# Patient Record
Sex: FEMALE | Race: BLACK OR AFRICAN AMERICAN | Employment: FULL TIME | URBAN - METROPOLITAN AREA
[De-identification: names, ages, dates, MRNs, and addresses within clinical notes are randomized per-mention and may not be internally consistent; named-entity substitution may affect disease eponyms.]

---

## 2019-01-16 PROBLEM — O99.810 ABNORMAL GLUCOSE AFFECTING PREGNANCY: Status: ACTIVE | Noted: 2019-01-16

## 2019-01-16 PROBLEM — O21.9 PREGNANCY RELATED NAUSEA AND VOMITING, ANTEPARTUM: Status: ACTIVE | Noted: 2019-01-16

## 2019-01-16 PROCEDURE — 88142 CYTOPATH C/V THIN LAYER: CPT

## 2019-01-17 ENCOUNTER — HOSPITAL ENCOUNTER (OUTPATIENT)
Dept: LAB | Age: 24
Discharge: HOME | End: 2019-01-17
Attending: OBSTETRICS & GYNECOLOGY
Payer: COMMERCIAL

## 2019-01-18 ENCOUNTER — HOSPITAL ENCOUNTER (OUTPATIENT)
Dept: LAB | Age: 24
Discharge: HOME | End: 2019-01-18
Payer: COMMERCIAL

## 2019-01-18 LAB — GLUCOSE P FAST SERPL-MCNC: 86 MG/DL

## 2019-01-18 PROCEDURE — 36415 COLL VENOUS BLD VENIPUNCTURE: CPT

## 2019-02-11 PROBLEM — Z34.80 PRENATAL CARE OF MULTIGRAVIDA, ANTEPARTUM: Status: ACTIVE | Noted: 2019-02-11

## 2019-03-29 ENCOUNTER — HOSPITAL ENCOUNTER (OUTPATIENT)
Dept: LAB | Age: 24
Discharge: HOME | End: 2019-03-29
Payer: COMMERCIAL

## 2019-03-29 LAB
GLUCOSE P FAST SERPL-MCNC: 75 MG/DL
GLUCOSE, 1 HR,GTT1: 130 MG/DL
GLUCOSE, 2 HR,GTT2: 156 MG/DL
GLUCOSE, 3 HR,GTT3: 109 MG/DL

## 2019-03-29 PROCEDURE — 82951 GLUCOSE TOLERANCE TEST (GTT): CPT

## 2019-03-29 PROCEDURE — 82952 GTT-ADDED SAMPLES: CPT

## 2019-03-29 PROCEDURE — 36415 COLL VENOUS BLD VENIPUNCTURE: CPT

## 2019-04-18 PROBLEM — O44.00 PLACENTA PREVIA ANTEPARTUM: Status: ACTIVE | Noted: 2019-04-18

## 2019-05-30 PROBLEM — O44.00 PLACENTA PREVIA ANTEPARTUM: Status: RESOLVED | Noted: 2019-04-18 | Resolved: 2019-05-30

## 2019-06-10 ENCOUNTER — TELEPHONE (OUTPATIENT)
Dept: DIABETES SERVICES | Age: 24
End: 2019-06-10

## 2019-06-10 NOTE — TELEPHONE ENCOUNTER
No show for gestational diabetes education today. Called pt and left message regarding the other 2 available classes scheduled for this week. Asked pt to call back and let me know which class she can come to.

## 2019-06-14 ENCOUNTER — HOSPITAL ENCOUNTER (OUTPATIENT)
Dept: DIABETES SERVICES | Age: 24
Discharge: HOME | End: 2019-06-14
Payer: COMMERCIAL

## 2019-06-14 VITALS — HEIGHT: 67 IN | BODY MASS INDEX: 31.71 KG/M2 | WEIGHT: 202 LBS

## 2019-06-14 PROCEDURE — G0109 DIAB MANAGE TRN IND/GROUP: HCPCS

## 2019-06-14 NOTE — PROGRESS NOTES
Gestational Diabetes Self-Management Support Plan    Services Provided: Pt received education on nutrition and meal planning during pregnancy. Emotional support for adjustment to diagnosis was provided. Care Plan/Recommendations:  Pt instructed to record blood sugar 4x/day and record all meals and snacks. Pt to bring information to appointments with [facility]. Notes for Follow Up:   1. Barriers to checking blood glucose and adherence to meal plan identified: pt to  her glucometer today  2. Barriers to psychological adjustment to diagnosis identified: none-history of gestational diabetes with first pregnancy. 3. Patient needs to be seen by [facility] ASAP due to: pt reports blood sugars not controlled with last pregnancy and was on insulin.     Clement Perkins, [address], LD, CDE  [facility] [address]

## 2019-06-21 PROBLEM — O24.419 GESTATIONAL DIABETES: Status: ACTIVE | Noted: 2019-06-21

## 2019-06-21 PROBLEM — Z98.891 HISTORY OF C-SECTION: Status: ACTIVE | Noted: 2019-06-21

## 2019-06-21 PROBLEM — O44.03 PLACENTA PREVIA ANTEPARTUM IN THIRD TRIMESTER: Status: ACTIVE | Noted: 2019-04-18

## 2019-07-22 PROBLEM — O34.219 HISTORY OF CESAREAN DELIVERY AFFECTING PREGNANCY: Status: ACTIVE | Noted: 2019-06-21

## 2019-07-22 PROBLEM — O21.9 PREGNANCY RELATED NAUSEA AND VOMITING, ANTEPARTUM: Status: RESOLVED | Noted: 2019-01-16 | Resolved: 2019-07-22

## 2019-07-22 PROBLEM — O09.93 HIGH-RISK PREGNANCY IN THIRD TRIMESTER: Status: ACTIVE | Noted: 2019-02-11

## 2019-07-25 PROBLEM — O24.414 INSULIN CONTROLLED GESTATIONAL DIABETES MELLITUS (GDM) IN THIRD TRIMESTER: Status: ACTIVE | Noted: 2019-06-21

## 2019-07-25 PROBLEM — Z86.32 HISTORY OF GESTATIONAL DIABETES IN PRIOR PREGNANCY, CURRENTLY PREGNANT, THIRD TRIMESTER: Status: ACTIVE | Noted: 2019-07-25

## 2019-07-25 PROBLEM — O09.293 HISTORY OF GESTATIONAL DIABETES IN PRIOR PREGNANCY, CURRENTLY PREGNANT, THIRD TRIMESTER: Status: ACTIVE | Noted: 2019-07-25

## 2019-08-01 PROBLEM — O40.9XX0 POLYHYDRAMNIOS AFFECTING PREGNANCY: Status: ACTIVE | Noted: 2019-08-01

## 2019-08-01 PROBLEM — O09.299 HISTORY OF MACROSOMIA IN INFANT IN PRIOR PREGNANCY, CURRENTLY PREGNANT: Status: ACTIVE | Noted: 2019-08-01

## 2019-08-05 PROBLEM — O44.03 PLACENTA PREVIA ANTEPARTUM IN THIRD TRIMESTER: Status: RESOLVED | Noted: 2019-04-18 | Resolved: 2019-08-05

## 2019-08-12 ENCOUNTER — ANESTHESIA (OUTPATIENT)
Dept: LABOR AND DELIVERY | Age: 24
DRG: 540 | End: 2019-08-12
Payer: COMMERCIAL

## 2019-08-12 ENCOUNTER — HOSPITAL ENCOUNTER (INPATIENT)
Age: 24
LOS: 3 days | Discharge: HOME | DRG: 540 | End: 2019-08-15
Attending: OBSTETRICS & GYNECOLOGY | Admitting: OBSTETRICS & GYNECOLOGY
Payer: COMMERCIAL

## 2019-08-12 ENCOUNTER — ANESTHESIA EVENT (OUTPATIENT)
Dept: LABOR AND DELIVERY | Age: 24
DRG: 540 | End: 2019-08-12
Payer: COMMERCIAL

## 2019-08-12 DIAGNOSIS — O34.219 HISTORY OF CESAREAN DELIVERY AFFECTING PREGNANCY: Primary | ICD-10-CM

## 2019-08-12 PROBLEM — Z37.9 NORMAL LABOR: Status: ACTIVE | Noted: 2019-08-12

## 2019-08-12 LAB
A1 MICROGLOB PLACENTAL VAG QL: POSITIVE
ABO + RH BLD: NORMAL
ANION GAP SERPL CALC-SCNC: 7 MMOL/L (ref 7–16)
BASOPHILS # BLD: 0 K/UL (ref 0–0.2)
BASOPHILS NFR BLD: 0 % (ref 0–2)
BLOOD GROUP ANTIBODIES SERPL: NORMAL
BUN SERPL-MCNC: 16 MG/DL (ref 6–23)
CALCIUM SERPL-MCNC: 8.8 MG/DL (ref 8.3–10.4)
CHLORIDE SERPL-SCNC: 109 MMOL/L (ref 98–107)
CO2 SERPL-SCNC: 22 MMOL/L (ref 21–32)
CONTROL LINE PRESENT?: NORMAL
CREAT SERPL-MCNC: 0.65 MG/DL (ref 0.6–1)
DIFFERENTIAL METHOD BLD: ABNORMAL
EOSINOPHIL # BLD: 0 K/UL (ref 0–0.8)
EOSINOPHIL NFR BLD: 0 % (ref 0.5–7.8)
ERYTHROCYTE [DISTWIDTH] IN BLOOD BY AUTOMATED COUNT: 13.2 % (ref 11.9–14.6)
EXPIRATION DATE: NORMAL
GLUCOSE BLD STRIP.AUTO-MCNC: 90 MG/DL (ref 65–100)
GLUCOSE SERPL-MCNC: 80 MG/DL (ref 65–100)
HCT VFR BLD AUTO: 33.2 % (ref 35.8–46.3)
HGB BLD-MCNC: 10.4 G/DL (ref 11.7–15.4)
IMM GRANULOCYTES # BLD AUTO: 0.2 K/UL (ref 0–0.5)
IMM GRANULOCYTES NFR BLD AUTO: 2 % (ref 0–5)
INTERNAL NEGATIVE CONTROL: NORMAL
KIT LOT NO.: NORMAL
LYMPHOCYTES # BLD: 1.3 K/UL (ref 0.5–4.6)
LYMPHOCYTES NFR BLD: 16 % (ref 13–44)
MCH RBC QN AUTO: 30.1 PG (ref 26.1–32.9)
MCHC RBC AUTO-ENTMCNC: 31.3 G/DL (ref 31.4–35)
MCV RBC AUTO: 96 FL (ref 79.6–97.8)
MONOCYTES # BLD: 1 K/UL (ref 0.1–1.3)
MONOCYTES NFR BLD: 13 % (ref 4–12)
NEUTS SEG # BLD: 5.3 K/UL (ref 1.7–8.2)
NEUTS SEG NFR BLD: 68 % (ref 43–78)
NRBC # BLD: 0 K/UL (ref 0–0.2)
PLATELET # BLD AUTO: 139 K/UL (ref 150–450)
PMV BLD AUTO: 12.9 FL (ref 9.4–12.3)
POTASSIUM SERPL-SCNC: 4 MMOL/L (ref 3.5–5.1)
RBC # BLD AUTO: 3.46 M/UL (ref 4.05–5.2)
SODIUM SERPL-SCNC: 138 MMOL/L (ref 136–145)
SPECIMEN EXP DATE BLD: NORMAL
WBC # BLD AUTO: 7.7 K/UL (ref 4.3–11.1)

## 2019-08-12 PROCEDURE — 4A1HXCZ MONITORING OF PRODUCTS OF CONCEPTION, CARDIAC RATE, EXTERNAL APPROACH: ICD-10-PCS | Performed by: OBSTETRICS & GYNECOLOGY

## 2019-08-12 PROCEDURE — 77030018846 HC SOL IRR STRL H20 ICUM -A: Performed by: OBSTETRICS & GYNECOLOGY

## 2019-08-12 PROCEDURE — 77030008459 HC STPLR SKN COOP -B: Performed by: OBSTETRICS & GYNECOLOGY

## 2019-08-12 PROCEDURE — 99283 EMERGENCY DEPT VISIT LOW MDM: CPT | Performed by: OBSTETRICS & GYNECOLOGY

## 2019-08-12 PROCEDURE — 77030031139 HC SUT VCRL2 J&J -A: Performed by: OBSTETRICS & GYNECOLOGY

## 2019-08-12 PROCEDURE — 76010000391 HC C SECN FIRST 1 HR: Performed by: OBSTETRICS & GYNECOLOGY

## 2019-08-12 PROCEDURE — 74011250636 HC RX REV CODE- 250/636: Performed by: ANESTHESIOLOGY

## 2019-08-12 PROCEDURE — 77030002966 HC SUT PDS J&J -A: Performed by: OBSTETRICS & GYNECOLOGY

## 2019-08-12 PROCEDURE — 77030034696 HC CATH URETH FOL 2W BARD -A: Performed by: OBSTETRICS & GYNECOLOGY

## 2019-08-12 PROCEDURE — 65270000029 HC RM PRIVATE

## 2019-08-12 PROCEDURE — 74011250636 HC RX REV CODE- 250/636: Performed by: OBSTETRICS & GYNECOLOGY

## 2019-08-12 PROCEDURE — 74011250636 HC RX REV CODE- 250/636

## 2019-08-12 PROCEDURE — 85025 COMPLETE CBC W/AUTO DIFF WBC: CPT

## 2019-08-12 PROCEDURE — 77030002933 HC SUT MCRYL J&J -A: Performed by: OBSTETRICS & GYNECOLOGY

## 2019-08-12 PROCEDURE — 74011000250 HC RX REV CODE- 250: Performed by: ANESTHESIOLOGY

## 2019-08-12 PROCEDURE — 36415 COLL VENOUS BLD VENIPUNCTURE: CPT

## 2019-08-12 PROCEDURE — 77030018836 HC SOL IRR NACL ICUM -A: Performed by: OBSTETRICS & GYNECOLOGY

## 2019-08-12 PROCEDURE — 74011000250 HC RX REV CODE- 250

## 2019-08-12 PROCEDURE — 84112 EVAL AMNIOTIC FLUID PROTEIN: CPT | Performed by: OBSTETRICS & GYNECOLOGY

## 2019-08-12 PROCEDURE — 76060000078 HC EPIDURAL ANESTHESIA: Performed by: OBSTETRICS & GYNECOLOGY

## 2019-08-12 PROCEDURE — 77030007880 HC KT SPN EPDRL BBMI -B: Performed by: NURSE ANESTHETIST, CERTIFIED REGISTERED

## 2019-08-12 PROCEDURE — 74011250637 HC RX REV CODE- 250/637: Performed by: ANESTHESIOLOGY

## 2019-08-12 PROCEDURE — 77030003665 HC NDL SPN BBMI -A: Performed by: NURSE ANESTHETIST, CERTIFIED REGISTERED

## 2019-08-12 PROCEDURE — 80048 BASIC METABOLIC PNL TOTAL CA: CPT

## 2019-08-12 PROCEDURE — 86900 BLOOD TYPING SEROLOGIC ABO: CPT

## 2019-08-12 PROCEDURE — 77030005518 HC CATH URETH FOL 2W BARD -B: Performed by: OBSTETRICS & GYNECOLOGY

## 2019-08-12 PROCEDURE — 75410000003 HC RECOV DEL/VAG/CSECN EA 0.5 HR: Performed by: OBSTETRICS & GYNECOLOGY

## 2019-08-12 PROCEDURE — 76010000392 HC C SECN EA ADDL 0.5 HR: Performed by: OBSTETRICS & GYNECOLOGY

## 2019-08-12 PROCEDURE — 82962 GLUCOSE BLOOD TEST: CPT

## 2019-08-12 PROCEDURE — 77030032490 HC SLV COMPR SCD KNE COVD -B: Performed by: OBSTETRICS & GYNECOLOGY

## 2019-08-12 RX ORDER — EPHEDRINE SULFATE 50 MG/ML
INJECTION, SOLUTION INTRAVENOUS AS NEEDED
Status: DISCONTINUED | OUTPATIENT
Start: 2019-08-12 | End: 2019-08-12 | Stop reason: HOSPADM

## 2019-08-12 RX ORDER — TRISODIUM CITRATE DIHYDRATE AND CITRIC ACID MONOHYDRATE 500; 334 MG/5ML; MG/5ML
30 SOLUTION ORAL
Status: COMPLETED | OUTPATIENT
Start: 2019-08-12 | End: 2019-08-12

## 2019-08-12 RX ORDER — NALOXONE HYDROCHLORIDE 0.4 MG/ML
0.2 INJECTION, SOLUTION INTRAMUSCULAR; INTRAVENOUS; SUBCUTANEOUS
Status: ACTIVE | OUTPATIENT
Start: 2019-08-12 | End: 2019-08-13

## 2019-08-12 RX ORDER — LORAZEPAM 2 MG/ML
0.5 INJECTION INTRAMUSCULAR ONCE
Status: COMPLETED | OUTPATIENT
Start: 2019-08-12 | End: 2019-08-12

## 2019-08-12 RX ORDER — SODIUM CHLORIDE, SODIUM LACTATE, POTASSIUM CHLORIDE, CALCIUM CHLORIDE 600; 310; 30; 20 MG/100ML; MG/100ML; MG/100ML; MG/100ML
INJECTION, SOLUTION INTRAVENOUS
Status: DISCONTINUED | OUTPATIENT
Start: 2019-08-12 | End: 2019-08-12 | Stop reason: HOSPADM

## 2019-08-12 RX ORDER — MORPHINE SULFATE 1 MG/ML
INJECTION, SOLUTION EPIDURAL; INTRATHECAL; INTRAVENOUS
Status: DISCONTINUED | OUTPATIENT
Start: 2019-08-12 | End: 2019-08-12 | Stop reason: HOSPADM

## 2019-08-12 RX ORDER — BUPIVACAINE HYDROCHLORIDE 7.5 MG/ML
INJECTION, SOLUTION INTRASPINAL
Status: DISCONTINUED | OUTPATIENT
Start: 2019-08-12 | End: 2019-08-12 | Stop reason: HOSPADM

## 2019-08-12 RX ORDER — SODIUM CHLORIDE, SODIUM LACTATE, POTASSIUM CHLORIDE, CALCIUM CHLORIDE 600; 310; 30; 20 MG/100ML; MG/100ML; MG/100ML; MG/100ML
25 INJECTION, SOLUTION INTRAVENOUS CONTINUOUS
Status: DISCONTINUED | OUTPATIENT
Start: 2019-08-12 | End: 2019-08-13

## 2019-08-12 RX ORDER — SODIUM CHLORIDE 0.9 % (FLUSH) 0.9 %
5-40 SYRINGE (ML) INJECTION EVERY 8 HOURS
Status: DISCONTINUED | OUTPATIENT
Start: 2019-08-12 | End: 2019-08-15 | Stop reason: HOSPADM

## 2019-08-12 RX ORDER — OXYTOCIN/RINGER'S LACTATE 30/500 ML
PLASTIC BAG, INJECTION (ML) INTRAVENOUS
Status: DISCONTINUED | OUTPATIENT
Start: 2019-08-12 | End: 2019-08-12 | Stop reason: HOSPADM

## 2019-08-12 RX ORDER — OXYCODONE HYDROCHLORIDE 5 MG/1
10 TABLET ORAL
Status: ACTIVE | OUTPATIENT
Start: 2019-08-12 | End: 2019-08-13

## 2019-08-12 RX ORDER — KETOROLAC TROMETHAMINE 30 MG/ML
INJECTION, SOLUTION INTRAMUSCULAR; INTRAVENOUS AS NEEDED
Status: DISCONTINUED | OUTPATIENT
Start: 2019-08-12 | End: 2019-08-12 | Stop reason: HOSPADM

## 2019-08-12 RX ORDER — LORAZEPAM 2 MG/ML
0.5 INJECTION INTRAMUSCULAR ONCE
Status: DISPENSED | OUTPATIENT
Start: 2019-08-12 | End: 2019-08-13

## 2019-08-12 RX ORDER — DIPHENHYDRAMINE HYDROCHLORIDE 50 MG/ML
12.5 INJECTION, SOLUTION INTRAMUSCULAR; INTRAVENOUS
Status: ACTIVE | OUTPATIENT
Start: 2019-08-12 | End: 2019-08-13

## 2019-08-12 RX ORDER — MORPHINE SULFATE 0.5 MG/ML
INJECTION, SOLUTION EPIDURAL; INTRATHECAL; INTRAVENOUS AS NEEDED
Status: DISCONTINUED | OUTPATIENT
Start: 2019-08-12 | End: 2019-08-12 | Stop reason: HOSPADM

## 2019-08-12 RX ORDER — KETOROLAC TROMETHAMINE 30 MG/ML
30 INJECTION, SOLUTION INTRAMUSCULAR; INTRAVENOUS
Status: DISPENSED | OUTPATIENT
Start: 2019-08-12 | End: 2019-08-13

## 2019-08-12 RX ORDER — ONDANSETRON 2 MG/ML
4 INJECTION INTRAMUSCULAR; INTRAVENOUS
Status: DISPENSED | OUTPATIENT
Start: 2019-08-12 | End: 2019-08-13

## 2019-08-12 RX ORDER — ONDANSETRON 2 MG/ML
INJECTION INTRAMUSCULAR; INTRAVENOUS AS NEEDED
Status: DISCONTINUED | OUTPATIENT
Start: 2019-08-12 | End: 2019-08-12 | Stop reason: HOSPADM

## 2019-08-12 RX ORDER — DEXTROSE, SODIUM CHLORIDE, SODIUM LACTATE, POTASSIUM CHLORIDE, AND CALCIUM CHLORIDE 5; .6; .31; .03; .02 G/100ML; G/100ML; G/100ML; G/100ML; G/100ML
125 INJECTION, SOLUTION INTRAVENOUS CONTINUOUS
Status: DISCONTINUED | OUTPATIENT
Start: 2019-08-12 | End: 2019-08-12 | Stop reason: ALTCHOICE

## 2019-08-12 RX ORDER — HYDROMORPHONE HYDROCHLORIDE 2 MG/ML
1 INJECTION, SOLUTION INTRAMUSCULAR; INTRAVENOUS; SUBCUTANEOUS
Status: DISPENSED | OUTPATIENT
Start: 2019-08-12 | End: 2019-08-13

## 2019-08-12 RX ORDER — LORAZEPAM 0.5 MG/1
1 TABLET ORAL
Status: DISCONTINUED | OUTPATIENT
Start: 2019-08-12 | End: 2019-08-12 | Stop reason: ALTCHOICE

## 2019-08-12 RX ORDER — CEFAZOLIN SODIUM/WATER 2 G/20 ML
2 SYRINGE (ML) INTRAVENOUS ONCE
Status: COMPLETED | OUTPATIENT
Start: 2019-08-12 | End: 2019-08-12

## 2019-08-12 RX ORDER — SODIUM CHLORIDE, SODIUM LACTATE, POTASSIUM CHLORIDE, CALCIUM CHLORIDE 600; 310; 30; 20 MG/100ML; MG/100ML; MG/100ML; MG/100ML
2000 INJECTION, SOLUTION INTRAVENOUS CONTINUOUS
Status: DISCONTINUED | OUTPATIENT
Start: 2019-08-12 | End: 2019-08-12 | Stop reason: ALTCHOICE

## 2019-08-12 RX ORDER — SODIUM CHLORIDE 0.9 % (FLUSH) 0.9 %
5-40 SYRINGE (ML) INJECTION AS NEEDED
Status: DISCONTINUED | OUTPATIENT
Start: 2019-08-12 | End: 2019-08-15 | Stop reason: HOSPADM

## 2019-08-12 RX ADMIN — EPHEDRINE SULFATE 10 MG: 50 INJECTION, SOLUTION INTRAVENOUS at 14:35

## 2019-08-12 RX ADMIN — Medication 2 G: at 13:49

## 2019-08-12 RX ADMIN — ONDANSETRON 4 MG: 2 INJECTION INTRAMUSCULAR; INTRAVENOUS at 14:46

## 2019-08-12 RX ADMIN — SODIUM CHLORIDE, SODIUM LACTATE, POTASSIUM CHLORIDE, AND CALCIUM CHLORIDE 2000 ML: 600; 310; 30; 20 INJECTION, SOLUTION INTRAVENOUS at 12:55

## 2019-08-12 RX ADMIN — FAMOTIDINE 20 MG: 10 INJECTION, SOLUTION INTRAVENOUS at 13:50

## 2019-08-12 RX ADMIN — SODIUM CITRATE AND CITRIC ACID MONOHYDRATE 30 ML: 500; 334 SOLUTION ORAL at 13:50

## 2019-08-12 RX ADMIN — Medication 500 ML/HR: at 14:46

## 2019-08-12 RX ADMIN — BUPIVACAINE HYDROCHLORIDE 12 MG: 7.5 INJECTION, SOLUTION INTRASPINAL at 14:15

## 2019-08-12 RX ADMIN — KETOROLAC TROMETHAMINE 30 MG: 30 INJECTION, SOLUTION INTRAMUSCULAR; INTRAVENOUS at 14:58

## 2019-08-12 RX ADMIN — EPHEDRINE SULFATE 15 MG: 50 INJECTION, SOLUTION INTRAVENOUS at 14:57

## 2019-08-12 RX ADMIN — MORPHINE SULFATE 0.15 MG: 1 INJECTION, SOLUTION EPIDURAL; INTRATHECAL; INTRAVENOUS at 14:15

## 2019-08-12 RX ADMIN — LORAZEPAM 0.5 MG: 2 INJECTION, SOLUTION INTRAMUSCULAR; INTRAVENOUS at 19:39

## 2019-08-12 RX ADMIN — SODIUM CHLORIDE, SODIUM LACTATE, POTASSIUM CHLORIDE, CALCIUM CHLORIDE: 600; 310; 30; 20 INJECTION, SOLUTION INTRAVENOUS at 14:11

## 2019-08-12 RX ADMIN — AZITHROMYCIN MONOHYDRATE 500 MG: 500 INJECTION, POWDER, LYOPHILIZED, FOR SOLUTION INTRAVENOUS at 13:49

## 2019-08-12 NOTE — ANESTHESIA PROCEDURE NOTES
Spinal Block    Start time: 8/12/2019 2:11 PM  End time: 8/12/2019 2:15 PM  Performed by: Francisco Gaston MD  Authorized by: Francisco Gaston MD     Pre-procedure:   Indications: primary anesthetic  Preanesthetic Checklist: patient identified, risks and benefits discussed, anesthesia consent, site marked, patient being monitored and timeout performed    Timeout Time: 14:11          Spinal Block:   Patient Position:  Seated  Prep Region:  Lumbar  Prep: chlorhexidine      Location:  L3-4  Technique:  Single shot    Local Dose (mL):  1    Needle:   Needle Type:  Pencan  Needle Gauge:  25 G  Attempts:  1      Events: CSF confirmed, no blood with aspiration and no paresthesia        Assessment:  Insertion:  Uncomplicated  Patient tolerance:  Patient tolerated the procedure well with no immediate complications

## 2019-08-12 NOTE — PROGRESS NOTES
Clarified order with . Questioned if he wanted Ativan p.o. Or I.V. Stating that we did get another I.V. On patient. Ativan 0.5 mg IV  ordered with temperature recheck in 15 minutes if no improvement give another 0.5 mg IV and recheck temperature in an additional 15 minutes. Call if no improvement.

## 2019-08-12 NOTE — ROUTINE PROCESS
SBAR IN Report: Mother Verbal report received from MAYKEL Teixeira RN (full name & credentials) on this patient, who is now being transferred from [facility] (unit) for routine post - op. The patient is wearing a green \"Anesthesia-Duramorph\" band. Report consisted of patient's Situation, Background, Assessment and Recommendations (SBAR).  ID bands were compared with the identification form, and verified with the patient and transferring nurse. Information from the Procedure Summary and the [place] Report was reviewed with the transferring nurse; opportunity for questions and clarification provided.

## 2019-08-12 NOTE — ANESTHESIA POSTPROCEDURE EVALUATION
Procedure(s):   SECTION. No value filed. Anesthesia Post Evaluation      Multimodal analgesia: multimodal analgesia used between 6 hours prior to anesthesia start to PACU discharge  Patient location during evaluation: PACU  Patient participation: complete - patient participated  Level of consciousness: awake and alert  Pain management: adequate  Airway patency: patent  Anesthetic complications: no  Cardiovascular status: acceptable  Respiratory status: acceptable  Hydration status: acceptable  Post anesthesia nausea and vomiting:  none      No vitals data found for the desired time range.

## 2019-08-12 NOTE — H&P
History & Physical    Name: Tal Schmidt MRN: [id number]  SSN: [id number]    YOB: 1995  Age: 20 y.o. Sex: female        Subjective: Pt presents with SROM 2-3 hours ago and painful uterine ctx every 4-5 minutes lasting 30 seconds. There are no ameliorating or exacerbating factors. Prenatal course c/b Insulin requiring GDM, polyhydramnios, and prior C section. Estimated Date of Delivery: 19  OB History    Para Term  AB Living   2 1 1 0 0 1   SAB TAB Ectopic Molar Multiple Live Births   0 0 0 0 0 1      # Outcome Date GA Lbr Chilo/2nd Weight Sex Delivery Anes PTL Lv   2 Current            1 Term 2017 37w0d  4.366 kg M CS-LTranv   DARLENE       Ms. Herbert Hermosillo is seen with pregnancy at 38w0d for Presumptive ROM . Please see prenatal records for details. Past Medical History:   Diagnosis Date    Anemia     Chlamydia     5 yrs ago?  Gestational diabetes      Past Surgical History:   Procedure Laterality Date    HX  SECTION       Social History     Occupational History    Not on file   Tobacco Use    Smoking status: Former Smoker    Smokeless tobacco: Never Used   Substance and Sexual Activity    Alcohol use: No     Frequency: Never    Drug use: No    Sexual activity: Yes     Partners: Male     Birth control/protection: None     Family History   Problem Relation Age of Onset    Cancer Maternal Aunt         Lung and other    Alzheimer Maternal Grandmother        No Known Allergies  Prior to Admission medications    Medication Sig Start Date End Date Taking? Authorizing Provider   insulin detemir U-100 (LEVEMIR FLEXTOUCH U-100 INSULN) 100 unit/mL (3 mL) inpn 12-25 units as directed twice daily; May substitute with Lantus or Basaglar 19  Yes Osmar Garrido MD   Insulin Needles, Disposable, (UNIFINE PENTIPS) 32 gauge x \" ndle 1 Pen Needle by Does Not Apply route five (5) times daily.  19  Yes Osmar Garrido MD   [facility] ULTRA BLUE TEST STRIP strip TEST 4 TIMES A DAY 6/3/19  Yes Provider, Historical   Tim Robi monitoring kit TEST 4 TIMES A DAY 6/3/19  Yes Provider, Historical   ONE TOUCH DELICA 33 gauge misc TEST 4 TIMES A DAY 6/3/19  Yes Provider, Historical   prenatal multivit-ca-min-fe-fa tab Take 1 Tab by mouth daily. 19  Yes Jan Earl MD        Review of Systems:  Constitutional:No headache, fever  Cardiac:   No chest pain      Resp: No cough or shortness of breath     GI:   No nausea/vomiting, diarrhea, abdominal pain    :   No dysuria  Neuro:     No vision changes, headache      Objective:     Vitals:  Vitals:    19 1217 19 1218   BP: 125/71    Pulse: 86    Resp: 18    Temp: 99 °F (37.2 °C)    Weight:  100.2 kg (221 lb)        Physical Exam:  Patient without distress. Heart: Regular rate and rhythm  Lung: clear to auscultation throughout lung fields, no wheezes, no rales, no rhonchi and normal respiratory effort  Back: costovertebral angle tenderness absent  Abdomen: soft, nontender  Fundus: soft and non tender  Cervical Exam: 2 cm dilated    50% effaced    -2 station    Presenting Part: cephalic  Lower Extremities:  - Edema No  Membranes:  Spontaneous Rupture of Membranes; Amniotic Fluid: clear fluid  Fetal Heart Rate: Baseline: 150 per minute  Variability: moderate  Accelerations: no  Decelerations: none  Uterine contractions: regular, every 4-6 minutes    Prenatal Labs:   Lab Results   Component Value Date/Time    Rubella, External Immune  (52.0) 2019    HBsAg, External Negative 2019    HIV, External Negative 2019    RPR, External Negative 2019    Gonorrhea, External Negative 2019    Chlamydia, External Negative 2019         Assessment/Plan:     Ms. Klaus Blanton is a  seen with pregnancy at 38w0d for Presumptive ROM  and labor. No results found for: GRBSEXT    Plan:     Admit for repeat C section. Message left for Dr. Joaquina Turner.

## 2019-08-12 NOTE — OP NOTES
Ankur Funez  1995    Preop Dx:   1. IUP @ 38 weeks gestation   2. Prev C/S    Postop Dx: Same    Procedure: RLTCS    Surgeon: Karolina Turner     Anesthesia: spinal    EBL: 156 mL      Complications: None    Findings:  Viable Female infant. vtx position. APGARS 9,9.  Weight:   [id number]  Normal appearing uterus, tubes and ovaries. Procedure:  Pt was taken to the operating room where spinal anesthesia was found to be adequate. She was then prepped and draped in the usual sterile fashion and placed in the supine position with a leftward tilt. A Pfannenstiel skin incision was made with the scalpel and carried down to the underlying layer of fascia with the bovie. The fascia was incised in the midline and the incision extended laterally with the crabtree scissors. Superior of the fascial incision was grasped with Kocher clamps and  from the rectus muscles sharply and bluntly. In a similar fashion, the inferior aspect of the fascial incision was grasped with the Kocher clamps and  from the rectus muscles sharply and bluntly. The rectus muscles were  in the midline bluntly and peritoneum entered bluntly. The peritoneal incision was extended manually. Bladder flap was created by sharp dissectionand plapcement of a bladder blade. The lower uterine incision made with the scalpel. Incision extended manually laterally. Infants head delivered atraumatically. Nose and mouth suctioned. Cord clamped and cut. Infant handed off to the waiting NICU staff. The uterus was exteriorized and cleared of all clots and debris. Hysterotomy was closed with 0-vicryl in a running, locking fashion. The pelvis and gutters were cleared of all clots and debris, the uterus returned to the abdomen and hemostasis was assured throughout. Fascial incision repaired with 0-vicryl in a running fashion. Subcutaneous tissue was cleared of all clots and debris and hemostasis assured.   Subcutaneous tissue reapproximated with 3-0 vicryl rapide in a running fashion. Skin closed with insorb staples. Pt tolerated procedure well. Sponge, lap and needle counts correct x 3. Disposition: Pt to RR stable and infant to  nursery stable.     Pathology: none      Hildreth Cristian, DO  3:25 PM  19

## 2019-08-12 NOTE — PROGRESS NOTES
SBAR OUT Report: Mother    Verbal report given to Rosendo Guerrero RN on this patient, who is now being transferred to MIU for routine progression of care. The patient is wearing a green \"Anesthesia-Duramorph\" band. Report consisted of patient's Situation, Background, Assessment and Recommendations (SBAR).  ID bands were compared with the identification form, and verified with the patient and receiving nurse. Information from the Procedure Summary, Intake/Output and MAR and the [place] Report was reviewed with the receiving nurse; opportunity for questions and clarification provided.

## 2019-08-12 NOTE — PROGRESS NOTES
Nursing supervisor called for warming blanket. Dr. Elizabeth Quiñones on unit. Order received for 1 mg of ativan stat and recheck temp after 30 minutes.

## 2019-08-12 NOTE — PROGRESS NOTES
Upon hourly assessment, patient states she feels hot but her body tempature to the touch feels warm. Unable to obtain oral temp but axillary temp was 94.9. Warm blankets from OR placed on patient. IV had infiltrated upon assessment and unable to give Zofran for nausea. Dr. Chopra Heads contacted. Order received for bear hugger and MD will come see patient.

## 2019-08-12 NOTE — PROGRESS NOTES
FHTs in OR prior to spinal 150s with moderate variability; FHTs after spinal 145s with moderate variability.

## 2019-08-12 NOTE — ANESTHESIA PREPROCEDURE EVALUATION
Relevant Problems   No relevant active problems       Anesthetic History   No history of anesthetic complications            Review of Systems / Medical History  Patient summary reviewed, nursing notes reviewed and pertinent labs reviewed    Pulmonary  Within defined limits                 Neuro/Psych   Within defined limits           Cardiovascular  Within defined limits                Exercise tolerance: >4 METS     GI/Hepatic/Renal                Endo/Other    Diabetes: using insulin    Anemia     Other Findings   Comments: Gestational DM           Physical Exam    Airway  Mallampati: II  TM Distance: 4 - 6 cm  Neck ROM: normal range of motion   Mouth opening: Normal     Cardiovascular    Rhythm: regular           Dental  No notable dental hx       Pulmonary  Breath sounds clear to auscultation               Abdominal  GI exam deferred       Other Findings            Anesthetic Plan    ASA: 3  Anesthesia type: spinal            Anesthetic plan and risks discussed with: Patient

## 2019-08-12 NOTE — PROGRESS NOTES
Warming blanket also ordered. Nursing supervisor attempting to locate warming blanket. Warm blankets from O.R. Placed on patient until warmer can be located.

## 2019-08-13 LAB
HCT VFR BLD AUTO: 28.3 % (ref 35.8–46.3)
HGB BLD-MCNC: 8.9 G/DL (ref 11.7–15.4)

## 2019-08-13 PROCEDURE — 74011250636 HC RX REV CODE- 250/636: Performed by: ANESTHESIOLOGY

## 2019-08-13 PROCEDURE — 74011250637 HC RX REV CODE- 250/637: Performed by: OBSTETRICS & GYNECOLOGY

## 2019-08-13 PROCEDURE — 65270000029 HC RM PRIVATE

## 2019-08-13 PROCEDURE — 85018 HEMOGLOBIN: CPT

## 2019-08-13 PROCEDURE — 36415 COLL VENOUS BLD VENIPUNCTURE: CPT

## 2019-08-13 RX ORDER — IBUPROFEN 800 MG/1
800 TABLET ORAL 3 TIMES DAILY
Status: DISCONTINUED | OUTPATIENT
Start: 2019-08-13 | End: 2019-08-15 | Stop reason: HOSPADM

## 2019-08-13 RX ORDER — ADHESIVE BANDAGE
30 BANDAGE TOPICAL DAILY PRN
Status: DISCONTINUED | OUTPATIENT
Start: 2019-08-13 | End: 2019-08-15 | Stop reason: HOSPADM

## 2019-08-13 RX ORDER — PRENATAL VIT 96/IRON FUM/FOLIC 27MG-0.8MG
1 TABLET ORAL DAILY
Status: DISCONTINUED | OUTPATIENT
Start: 2019-08-14 | End: 2019-08-15 | Stop reason: HOSPADM

## 2019-08-13 RX ORDER — SIMETHICONE 80 MG
80 TABLET,CHEWABLE ORAL
Status: DISCONTINUED | OUTPATIENT
Start: 2019-08-13 | End: 2019-08-15 | Stop reason: HOSPADM

## 2019-08-13 RX ORDER — SODIUM CHLORIDE 0.9 % (FLUSH) 0.9 %
5-40 SYRINGE (ML) INJECTION AS NEEDED
Status: DISCONTINUED | OUTPATIENT
Start: 2019-08-13 | End: 2019-08-15 | Stop reason: HOSPADM

## 2019-08-13 RX ORDER — OXYCODONE HYDROCHLORIDE 5 MG/1
5 TABLET ORAL
Status: DISCONTINUED | OUTPATIENT
Start: 2019-08-13 | End: 2019-08-15 | Stop reason: HOSPADM

## 2019-08-13 RX ORDER — NALOXONE HYDROCHLORIDE 0.4 MG/ML
0.4 INJECTION, SOLUTION INTRAMUSCULAR; INTRAVENOUS; SUBCUTANEOUS AS NEEDED
Status: DISCONTINUED | OUTPATIENT
Start: 2019-08-13 | End: 2019-08-15 | Stop reason: HOSPADM

## 2019-08-13 RX ORDER — ZOLPIDEM TARTRATE 5 MG/1
5 TABLET ORAL
Status: DISCONTINUED | OUTPATIENT
Start: 2019-08-13 | End: 2019-08-15 | Stop reason: HOSPADM

## 2019-08-13 RX ORDER — ACETAMINOPHEN 10 MG/ML
1000 INJECTION, SOLUTION INTRAVENOUS ONCE
Status: ACTIVE | OUTPATIENT
Start: 2019-08-13 | End: 2019-08-14

## 2019-08-13 RX ORDER — SODIUM CHLORIDE 0.9 % (FLUSH) 0.9 %
5-40 SYRINGE (ML) INJECTION EVERY 8 HOURS
Status: DISCONTINUED | OUTPATIENT
Start: 2019-08-13 | End: 2019-08-15 | Stop reason: HOSPADM

## 2019-08-13 RX ADMIN — OXYCODONE HYDROCHLORIDE 5 MG: 5 TABLET ORAL at 19:15

## 2019-08-13 RX ADMIN — SIMETHICONE 80 MG: 80 TABLET, CHEWABLE ORAL at 18:00

## 2019-08-13 RX ADMIN — IBUPROFEN 800 MG: 800 TABLET, FILM COATED ORAL at 17:59

## 2019-08-13 RX ADMIN — Medication 10 ML: at 09:11

## 2019-08-13 RX ADMIN — KETOROLAC TROMETHAMINE 30 MG: 30 INJECTION, SOLUTION INTRAMUSCULAR at 00:06

## 2019-08-13 RX ADMIN — KETOROLAC TROMETHAMINE 30 MG: 30 INJECTION, SOLUTION INTRAMUSCULAR at 09:10

## 2019-08-13 NOTE — LACTATION NOTE
Called to NCU to assist mom with pumping. Brought mom's breast pump kit to infant's room and reviewed with her how to use the pump as well as the breast pump kit. Started mom pumping in the initiation phase and she expressed zero. Reinforced to her the need to pump at least 8 times in 24 hours. Also reviewed with her how to label her syringes to bring back to the nursery. Lactation consultant with follow up tomorrow.

## 2019-08-13 NOTE — PROGRESS NOTES
Medina discontinued. OOB to restroom with standby assistance. Pericare taught. Advised to drink lots of water so that she can produce 2 voids greater than 200 prior to six hours after medina d/c. Patient tolerated activity well. Patient verbalized understanding.

## 2019-08-13 NOTE — PROGRESS NOTES
Pt is S/PLTC-section at term due to fetal intolerance of labor/CPD. No complaints today. Normal PO pain. No GI/ issues. Lochia < menses. No F/C. Visit Vitals  /69 (BP 1 Location: Right arm, BP Patient Position: At rest)   Pulse 74   Temp 98.4 °F (36.9 °C)   Resp 16   Wt 221 lb (100.2 kg)   SpO2 100%   Breastfeeding? Yes   BMI 34.61 kg/m²        CV - RRR  LUNGS - CTA bilaterally  ABD - soft, NABS, appropriate tenderness, incision C/D/I  EXT - tr edema bilaterally    Labs:    Recent Results (from the past 24 hour(s))   RUPTURE OF FETAL MEMBRANES, POC    Collection Time: 08/12/19 12:30 PM   Result Value Ref Range    Rupture of fetal membrane Positive Negative    Control line present? Acceptable     Internal negative control Acceptable     Kit Lot No. [id number]     Expiration date 02/03/2022    CBC WITH AUTOMATED DIFF    Collection Time: 08/12/19 12:57 PM   Result Value Ref Range    WBC 7.7 4.3 - 11.1 K/uL    RBC 3.46 (L) 4.05 - 5.2 M/uL    HGB 10.4 (L) 11.7 - 15.4 g/dL    HCT 33.2 (L) 35.8 - 46.3 %    MCV 96.0 79.6 - 97.8 FL    MCH 30.1 26.1 - 32.9 PG    MCHC 31.3 (L) 31.4 - 35.0 g/dL    RDW 13.2 11.9 - 14.6 %    PLATELET 935 (L) 510 - 450 K/uL    MPV 12.9 (H) 9.4 - 12.3 FL    ABSOLUTE NRBC 0.00 0.0 - 0.2 K/uL    DF AUTOMATED      NEUTROPHILS 68 43 - 78 %    LYMPHOCYTES 16 13 - 44 %    MONOCYTES 13 (H) 4.0 - 12.0 %    EOSINOPHILS 0 (L) 0.5 - 7.8 %    BASOPHILS 0 0.0 - 2.0 %    IMMATURE GRANULOCYTES 2 0.0 - 5.0 %    ABS. NEUTROPHILS 5.3 1.7 - 8.2 K/UL    ABS. LYMPHOCYTES 1.3 0.5 - 4.6 K/UL    ABS. MONOCYTES 1.0 0.1 - 1.3 K/UL    ABS. EOSINOPHILS 0.0 0.0 - 0.8 K/UL    ABS. BASOPHILS 0.0 0.0 - 0.2 K/UL    ABS. IMM.  GRANS. 0.2 0.0 - 0.5 K/UL   TYPE & SCREEN    Collection Time: 08/12/19 12:57 PM   Result Value Ref Range    Crossmatch Expiration 08/15/2019     ABO/Rh(D) A POSITIVE     Antibody screen NEG    GLUCOSE, POC    Collection Time: 08/12/19  1:08 PM   Result Value Ref Range    Glucose (POC) 90 65 - 897 mg/dL   METABOLIC PANEL, BASIC    Collection Time: 08/12/19  1:23 PM   Result Value Ref Range    Sodium 138 136 - 145 mmol/L    Potassium 4.0 3.5 - 5.1 mmol/L    Chloride 109 (H) 98 - 107 mmol/L    CO2 22 21 - 32 mmol/L    Anion gap 7 7 - 16 mmol/L    Glucose 80 65 - 100 mg/dL    BUN 16 6 - 23 MG/DL    Creatinine 0.65 0.6 - 1.0 MG/DL    GFR est AA >60 >60 ml/min/1.73m2    GFR est non-AA >60 >60 ml/min/1.73m2    Calcium 8.8 8.3 - 10.4 MG/DL   HGB & HCT    Collection Time: 08/13/19  6:06 AM   Result Value Ref Range    HGB 8.9 (L) 11.7 - 15.4 g/dL    HCT 28.3 (L) 35.8 - 46.3 %       POD #**1 LTCS     Pt is breast feeding . Stable, cont.  routine PP/PO care    Bernardino Raza DO  11:05 AM  08/13/19

## 2019-08-13 NOTE — PROGRESS NOTES
Chart reviewed - baby Sugey Morrissey is currently in the NICU due to hypoglycemia.  met with patient. Patient states that she's coping well with baby admission to the NICU as \"she's doing good. \"  Patient states that her son was briefly in the NICU, so that experience has been helpful when coping with daughter NICU stay.  provided informational packet on  mood disorder education/resources. Patient denies any history of postpartum depression/anxiety. Patient does not have a PCP or local pediatrician for baby - lists of MDs provided. Family receptive to receiving information and denied any additional needs from . Family has 's contact information should any needs/questions arise.     ARTURO Gandara-ALVARADO  [facility]   [phone]

## 2019-08-13 NOTE — PROGRESS NOTES
Patient is POD 1 s/p  and received neuraxial morphine for post-op pain control. Visit Vitals  /69 (BP 1 Location: Right arm, BP Patient Position: At rest)   Pulse 74   Temp 36.9 °C (98.4 °F)   Resp 16   Wt 100.2 kg (221 lb)   LMP 2018 (Exact Date)   SpO2 100%   Breastfeeding? Yes   BMI 34.61 kg/m²   Airway patent, patient appropriately hydrated and appears euvolemic. She reports minimal incisional pain. Patient reports moderate degree of pruritis which only responded minimally to diphenhydramine, but seems to be improving this am.  No nausea. Her lower extremities have returned to baseline neurologically. She was satisfied with the anesthetic and reports no complications. Continue current orders.

## 2019-08-13 NOTE — PROGRESS NOTES
Nutrition:  Best Practice Alert received for GDM. Diet: DIET REGULAR    Patient is s/p delivery. Will defer assessment as per standard of care.   [place], [place], [address], [place] [address]

## 2019-08-13 NOTE — PROGRESS NOTES
Shift assessment complete as noted. Patient request pain medicine. Toradol IVP administered for pain. Questions encouraged and answered. Pt ambulates to [facility] to visit with infant. Encouraged to call for needs or concerns. Verbalizes understanding.

## 2019-08-14 PROCEDURE — 65270000029 HC RM PRIVATE

## 2019-08-14 PROCEDURE — 74011250637 HC RX REV CODE- 250/637: Performed by: OBSTETRICS & GYNECOLOGY

## 2019-08-14 RX ORDER — DOCUSATE SODIUM 100 MG/1
100 CAPSULE, LIQUID FILLED ORAL 2 TIMES DAILY
Status: DISCONTINUED | OUTPATIENT
Start: 2019-08-14 | End: 2019-08-15 | Stop reason: HOSPADM

## 2019-08-14 RX ORDER — LANOLIN ALCOHOL/MO/W.PET/CERES
1 CREAM (GRAM) TOPICAL
Status: DISCONTINUED | OUTPATIENT
Start: 2019-08-14 | End: 2019-08-15 | Stop reason: HOSPADM

## 2019-08-14 RX ADMIN — SIMETHICONE 80 MG: 80 TABLET, CHEWABLE ORAL at 07:32

## 2019-08-14 RX ADMIN — IBUPROFEN 800 MG: 800 TABLET, FILM COATED ORAL at 02:07

## 2019-08-14 RX ADMIN — OXYCODONE HYDROCHLORIDE 5 MG: 5 TABLET ORAL at 22:04

## 2019-08-14 RX ADMIN — DOCUSATE SODIUM 100 MG: 100 CAPSULE, LIQUID FILLED ORAL at 11:18

## 2019-08-14 RX ADMIN — PRENATAL VIT W/ FE FUMARATE-FA TAB 27-0.8 MG 1 TABLET: 27-0.8 TAB at 11:18

## 2019-08-14 RX ADMIN — OXYCODONE HYDROCHLORIDE 5 MG: 5 TABLET ORAL at 13:11

## 2019-08-14 RX ADMIN — SIMETHICONE 80 MG: 80 TABLET, CHEWABLE ORAL at 11:18

## 2019-08-14 RX ADMIN — DOCUSATE SODIUM 100 MG: 100 CAPSULE, LIQUID FILLED ORAL at 20:02

## 2019-08-14 RX ADMIN — OXYCODONE HYDROCHLORIDE 5 MG: 5 TABLET ORAL at 07:31

## 2019-08-14 RX ADMIN — OXYCODONE HYDROCHLORIDE 5 MG: 5 TABLET ORAL at 02:07

## 2019-08-14 RX ADMIN — FERROUS SULFATE TAB 325 MG (65 MG ELEMENTAL FE) 325 MG: 325 (65 FE) TAB at 20:04

## 2019-08-14 RX ADMIN — FERROUS SULFATE TAB 325 MG (65 MG ELEMENTAL FE) 325 MG: 325 (65 FE) TAB at 11:18

## 2019-08-14 RX ADMIN — IBUPROFEN 800 MG: 800 TABLET, FILM COATED ORAL at 11:18

## 2019-08-14 RX ADMIN — SIMETHICONE 80 MG: 80 TABLET, CHEWABLE ORAL at 22:04

## 2019-08-14 RX ADMIN — IBUPROFEN 800 MG: 800 TABLET, FILM COATED ORAL at 20:04

## 2019-08-14 NOTE — PROGRESS NOTES
Shift assessment complete as noted. Patient resting comfortably. Questions encouraged and answered. Encouraged to call for needs or concerns. Verbalizes understanding. [phone]

## 2019-08-14 NOTE — PROGRESS NOTES
08/14/19 0209   Pain Assessment   Pain Scale 1 Numeric (0 - 10)   Pain Intensity 1 6   Pain Location 1 Abdomen; Incisional   Pain Orientation 1 Lower   Pain Description 1 Aching; Sore   Pain Intervention(s) 1 Medication (see MAR)     Per pt request.

## 2019-08-14 NOTE — PROGRESS NOTES
Pt is S/P LT . No complaints today. Normal PO pain. No GI/ issues. Lochia < menses. No F/C. Visit Vitals  /70 (BP 1 Location: Right arm, BP Patient Position: At rest)   Pulse 81   Temp 97.9 °F (36.6 °C)   Resp 18   Wt 221 lb (100.2 kg)   SpO2 97%   Breastfeeding? Yes   BMI 34.61 kg/m²        CV - RRR  LUNGS - CTA bilaterally  ABD - soft, NABS, appropriate tenderness, incision C/D/I  EXT - tr edema bilaterally  Incison: clean / sharif/ intact    Labs:  No results found for this or any previous visit (from the past 24 hour(s)). POD #2 LTCS - iron def anemia  Start iron  Pt is breast feeding  Stable, cont.  routine PP/PO 2    Val Olson DO  8:19 AM  19

## 2019-08-14 NOTE — LACTATION NOTE
In to see mom for follow up. She states she is pumping about q 3-4 hrs when awake. Sometimes sleeps longer at night. Getting drops only still. Encouraged her to try to pump at least 8 times per day. Reviewed normal pump volumes for first week of life. Reviewed different pump settings and tips to try to get milk to come in more. Mom reports baby still getting glucose through IV in SCN but working on slowly decreasing. Mom starting to eat lunch, no further needs/questions at this time.  Will follow up in am.

## 2019-08-14 NOTE — PROGRESS NOTES
Patient sitting up in bed, pumping at present time. Instructed patient to call out when she is finished for her AM assessment, patient verbalized understanding.

## 2019-08-15 VITALS
BODY MASS INDEX: 34.61 KG/M2 | WEIGHT: 221 LBS | HEART RATE: 75 BPM | RESPIRATION RATE: 16 BRPM | OXYGEN SATURATION: 99 % | DIASTOLIC BLOOD PRESSURE: 70 MMHG | SYSTOLIC BLOOD PRESSURE: 135 MMHG | TEMPERATURE: 98.2 F

## 2019-08-15 PROCEDURE — 74011250637 HC RX REV CODE- 250/637: Performed by: OBSTETRICS & GYNECOLOGY

## 2019-08-15 RX ORDER — LANOLIN ALCOHOL/MO/W.PET/CERES
325 CREAM (GRAM) TOPICAL
Qty: 90 TAB | Refills: 2 | Status: SHIPPED | OUTPATIENT
Start: 2019-08-15 | End: 2019-08-27

## 2019-08-15 RX ORDER — OXYCODONE HYDROCHLORIDE 5 MG/1
5 TABLET ORAL
Qty: 30 TAB | Refills: 0 | Status: SHIPPED | OUTPATIENT
Start: 2019-08-15 | End: 2019-08-22

## 2019-08-15 RX ORDER — IBUPROFEN 800 MG/1
800 TABLET ORAL
Qty: 30 TAB | Refills: 1 | Status: SHIPPED | OUTPATIENT
Start: 2019-08-15 | End: 2019-08-27

## 2019-08-15 RX ADMIN — OXYCODONE HYDROCHLORIDE 5 MG: 5 TABLET ORAL at 12:29

## 2019-08-15 RX ADMIN — FERROUS SULFATE TAB 325 MG (65 MG ELEMENTAL FE) 325 MG: 325 (65 FE) TAB at 12:29

## 2019-08-15 RX ADMIN — IBUPROFEN 800 MG: 800 TABLET, FILM COATED ORAL at 04:19

## 2019-08-15 RX ADMIN — DOCUSATE SODIUM 100 MG: 100 CAPSULE, LIQUID FILLED ORAL at 12:30

## 2019-08-15 RX ADMIN — SIMETHICONE 80 MG: 80 TABLET, CHEWABLE ORAL at 12:29

## 2019-08-15 RX ADMIN — IBUPROFEN 800 MG: 800 TABLET, FILM COATED ORAL at 12:29

## 2019-08-15 NOTE — DISCHARGE SUMMARY
Date of Admission:  2019 10:59 AM  Date of Discharge: 8/15/19    Patient Active Problem List   Diagnosis Code    High-risk pregnancy in third trimester O09.93    Insulin controlled gestational diabetes mellitus (GDM) in third trimester O24.414    History of  delivery affecting pregnancy O34.219    History of gestational diabetes in prior pregnancy, currently pregnant, third trimester O09.293, Z80.34    History of macrosomia in infant in prior pregnancy, currently pregnant O09.299    Polyhydramnios affecting pregnancy O40. 9XX0    Normal labor O80, Z37.9       Samule Paddy 20 y.o.  presented at 38w0d for active labor. Pt had repeat LTCS w/o complications. See delivery note for all delivery details. Pt's PP course was uneventful. On day of D/C, she was ambulating well, afebrile, with lochia < menses. She was discharged home with medications as below. Pt was breast feeding on discharge. .  Routine PP instructions given to patient. She is to follow up with [facility]  in 2 weeks for PP exam.    Current Discharge Medication List      START taking these medications    Details   ferrous sulfate 325 mg (65 mg iron) tablet Take 1 Tab by mouth three (3) times daily (with meals). Qty: 90 Tab, Refills: 2      ibuprofen (MOTRIN) 800 mg tablet Take 1 Tab by mouth every eight (8) hours as needed for Pain. Indications: pain  Qty: 30 Tab, Refills: 1      oxyCODONE IR (ROXICODONE) 5 mg immediate release tablet Take 1 Tab by mouth every six (6) hours as needed for Pain for up to 7 days. Max Daily Amount: 20 mg.  Qty: 30 Tab, Refills: 0    Associated Diagnoses: History of  delivery affecting pregnancy         CONTINUE these medications which have NOT CHANGED    Details   insulin detemir U-100 (LEVEMIR FLEXTOUCH U-100 INSULN) 100 unit/mL (3 mL) inpn 12-25 units as directed twice daily;  May substitute with Lantus or Basaglar  Qty: 5 Pen, Refills: 5    Associated Diagnoses: Insulin controlled gestational diabetes mellitus (GDM) in third trimester      Insulin Needles, Disposable, (UNIFINE PENTIPS) 32 gauge x 5/32\" ndle 1 Pen Needle by Does Not Apply route five (5) times daily. Qty: 150 Pen Needle, Refills: 5    Associated Diagnoses: Insulin controlled gestational diabetes mellitus (GDM) in third trimester      ONETOUCH ULTRA BLUE TEST STRIP strip TEST 4 TIMES A DAY  Refills: 3      ONETOUCH ULTRA2 monitoring kit TEST 4 TIMES A DAY  Refills: 0      ONE TOUCH DELICA 33 gauge misc TEST 4 TIMES A DAY  Refills: 3      prenatal multivit-ca-min-fe-fa tab Take 1 Tab by mouth daily.   Qty: 30 Tab, Refills: 6    Associated Diagnoses: Prenatal care of multigravida, antepartum             Dorotha Shallow, DO  3:32 PM  08/15/19

## 2019-08-15 NOTE — DISCHARGE INSTRUCTIONS
Patient Education         Section: What to Expect at Home  Your Recovery    A  section, or , is surgery to deliver your baby through a cut, called an incision, that the doctor makes in your lower belly and uterus. You may have some pain in your lower belly and need pain medicine for 1 to 2 weeks. You can expect some vaginal bleeding for several weeks. You will probably need about 6 weeks to fully recover. It is important to take it easy while the incision is healing. Avoid heavy lifting, strenuous activities, or exercises that strain the belly muscles while you are recovering. Ask a family member or friend for help with housework, cooking, and shopping. This care sheet gives you a general idea about how long it will take for you to recover. But each person recovers at a different pace. Follow the steps below to get better as quickly as possible. How can you care for yourself at home? Activity    · Rest when you feel tired. Getting enough sleep will help you recover.     · Try to walk each day. Start by walking a little more than you did the day before. Bit by bit, increase the amount you walk. Walking boosts blood flow and helps prevent pneumonia, constipation, and blood clots.     · Avoid strenuous activities, such as bicycle riding, jogging, weightlifting, and aerobic exercise, for 6 weeks or until your doctor says it is okay.     · Until your doctor says it is okay, do not lift anything heavier than your baby.     · Do not do sit-ups or other exercises that strain the belly muscles for 6 weeks or until your doctor says it is okay.     · Hold a pillow over your incision when you cough or take deep breaths. This will support your belly and decrease your pain.     · You may shower as usual. Pat the incision dry when you are done.     · You will have some vaginal bleeding. Wear sanitary pads.  Do not douche or use tampons until your doctor says it is okay.     · Ask your doctor when you can drive again.     · You will probably need to take at least 6 weeks off work. It depends on the type of work you do and how you feel.     · Ask your doctor when it is okay for you to have sex. Diet    · You can eat your normal diet. If your stomach is upset, try bland, low-fat foods like plain rice, broiled chicken, toast, and yogurt.     · Drink plenty of fluids (unless your doctor tells you not to).     · You may notice that your bowel movements are not regular right after your surgery. This is common. Try to avoid constipation and straining with bowel movements. You may want to take a fiber supplement every day. If you have not had a bowel movement after a couple of days, ask your doctor about taking a mild laxative.     · If you are breastfeeding, limit alcohol. Alcohol can cause a lack of energy and other health problems for the baby when a breastfeeding woman drinks heavily. It can also get in the way of a mom's ability to feed her baby or to care for the child in other ways. There isn't a lot of research about exactly how much alcohol can harm a baby. Having no alcohol is the safest choice for your baby. If you choose to have a drink now and then, have only one drink, and limit the number of occasions that you have a drink. Wait to breastfeed at least 2 hours after you have a drink to reduce the amount of alcohol the baby may get in the milk. Medicines    · Your doctor will tell you if and when you can restart your medicines. He or she will also give you instructions about taking any new medicines.     · If you take blood thinners, such as warfarin (Coumadin), clopidogrel (Plavix), or aspirin, be sure to talk to your doctor. He or she will tell you if and when to start taking those medicines again. Make sure that you understand exactly what your doctor wants you to do.     · Take pain medicines exactly as directed. ? If the doctor gave you a prescription medicine for pain, take it as prescribed. ?  If you are not taking a prescription pain medicine, ask your doctor if you can take an over-the-counter medicine.     · If you think your pain medicine is making you sick to your stomach:  ? Take your medicine after meals (unless your doctor has told you not to). ? Ask your doctor for a different pain medicine.     · If your doctor prescribed antibiotics, take them as directed. Do not stop taking them just because you feel better. You need to take the full course of antibiotics. Incision care    · If you have strips of tape on the incision, leave the tape on for a week or until it falls off.     · Wash the area daily with warm, soapy water, and pat it dry. Don't use hydrogen peroxide or alcohol, which can slow healing. You may cover the area with a gauze bandage if it weeps or rubs against clothing. Change the bandage every day.     · Keep the area clean and dry. Other instructions    · If you breastfeed your baby, you may be more comfortable while you are healing if you place the baby so that he or she is not resting on your belly. Try tucking your baby under your arm, with his or her body along the side you will be feeding on. Support your baby's upper body with your arm. With that hand you can control your baby's head to bring his or her mouth to your breast. This is sometimes called the football hold. Follow-up care is a key part of your treatment and safety. Be sure to make and go to all appointments, and call your doctor if you are having problems. It's also a good idea to know your test results and keep a list of the medicines you take. When should you call for help? [id number] anytime you think you may need emergency care.  For example, call if:    · You have thoughts of harming yourself, your baby, or another person.     · You passed out (lost consciousness).     · You have chest pain, are short of breath, or cough up blood.     · You have a seizure.    Call your doctor now or seek immediate medical care if:    · You have pain that does not get better after you take pain medicine.     · You have severe vaginal bleeding.     · You are dizzy or lightheaded, or you feel like you may faint.     · You have new or worse pain in your belly or pelvis.     · You have loose stitches, or your incision comes open.     · You have symptoms of infection, such as:  ? Increased pain, swelling, warmth, or redness. ? Red streaks leading from the incision. ? Pus draining from the incision. ? A fever.     · You have symptoms of a blood clot in your leg (called a deep vein thrombosis), such as:  ? Pain in your calf, back of the knee, thigh, or groin. ? Redness and swelling in your leg or groin.     · You have signs of preeclampsia, such as:  ? Sudden swelling of your face, hands, or feet. ? New vision problems (such as dimness, blurring, or seeing spots). ? A severe headache.    Watch closely for changes in your health, and be sure to contact your doctor if:    · You do not get better as expected. Where can you learn more? Go to http://parris-oneyda.info/. Enter M806 in the search box to learn more about \" Section: What to Expect at Home. \"  Current as of: 2018  Content Version: 12.1  © 2465-6650 [employer]. Care instructions adapted under license by [employer] (which disclaims liability or warranty for this information). If you have questions about a medical condition or this instruction, always ask your healthcare professional. [place] [address] any warranty or liability for your use of this information.

## 2019-08-15 NOTE — PROGRESS NOTES
Patient discharged from Mother Infant room. Discharge teaching complete. Patient verbalizes understanding. Questions encouraged and answered. Patient ambulate to [facility] to visit infant. Stable at discharge.

## 2019-09-07 ENCOUNTER — APPOINTMENT (OUTPATIENT)
Dept: GENERAL RADIOLOGY | Age: 24
End: 2019-09-07
Attending: EMERGENCY MEDICINE
Payer: COMMERCIAL

## 2019-09-07 ENCOUNTER — HOSPITAL ENCOUNTER (EMERGENCY)
Age: 24
Discharge: HOME | End: 2019-09-08
Attending: EMERGENCY MEDICINE
Payer: COMMERCIAL

## 2019-09-07 DIAGNOSIS — J06.9 ACUTE URI: Primary | ICD-10-CM

## 2019-09-07 DIAGNOSIS — J98.01 ACUTE BRONCHOSPASM: ICD-10-CM

## 2019-09-07 PROCEDURE — 94640 AIRWAY INHALATION TREATMENT: CPT

## 2019-09-07 PROCEDURE — 99284 EMERGENCY DEPT VISIT MOD MDM: CPT | Performed by: EMERGENCY MEDICINE

## 2019-09-07 PROCEDURE — 77030013140 HC MSK NEB VYRM -A

## 2019-09-07 PROCEDURE — 71046 X-RAY EXAM CHEST 2 VIEWS: CPT

## 2019-09-07 RX ORDER — IPRATROPIUM BROMIDE AND ALBUTEROL SULFATE 2.5; .5 MG/3ML; MG/3ML
3 SOLUTION RESPIRATORY (INHALATION)
Status: COMPLETED | OUTPATIENT
Start: 2019-09-07 | End: 2019-09-08

## 2019-09-08 VITALS
RESPIRATION RATE: 16 BRPM | WEIGHT: 200 LBS | TEMPERATURE: 99.1 F | DIASTOLIC BLOOD PRESSURE: 72 MMHG | SYSTOLIC BLOOD PRESSURE: 142 MMHG | BODY MASS INDEX: 31.39 KG/M2 | HEIGHT: 67 IN | HEART RATE: 80 BPM | OXYGEN SATURATION: 97 %

## 2019-09-08 PROCEDURE — 74011250637 HC RX REV CODE- 250/637: Performed by: EMERGENCY MEDICINE

## 2019-09-08 PROCEDURE — 74011000250 HC RX REV CODE- 250: Performed by: EMERGENCY MEDICINE

## 2019-09-08 PROCEDURE — 77030013140 HC MSK NEB VYRM -A

## 2019-09-08 RX ORDER — BENZONATATE 100 MG/1
200 CAPSULE ORAL ONCE
Status: COMPLETED | OUTPATIENT
Start: 2019-09-08 | End: 2019-09-08

## 2019-09-08 RX ORDER — BENZONATATE 200 MG/1
200 CAPSULE ORAL
Qty: 15 CAP | Refills: 0 | Status: SHIPPED | OUTPATIENT
Start: 2019-09-08 | End: 2019-09-13

## 2019-09-08 RX ORDER — ALBUTEROL SULFATE 90 UG/1
2 AEROSOL, METERED RESPIRATORY (INHALATION)
Qty: 1 INHALER | Refills: 0 | Status: SHIPPED | OUTPATIENT
Start: 2019-09-08 | End: 2019-09-24

## 2019-09-08 RX ADMIN — IPRATROPIUM BROMIDE AND ALBUTEROL SULFATE 3 ML: .5; 3 SOLUTION RESPIRATORY (INHALATION) at 00:00

## 2019-09-08 RX ADMIN — BENZONATATE 200 MG: 100 CAPSULE ORAL at 01:11

## 2019-09-08 NOTE — ED NOTES
Pt presents to the ED for cough and wheezing for several days. Nonproductive cough.   Hx of bronchitis

## 2019-09-08 NOTE — ED NOTES
I have reviewed discharge instructions with the patient. The patient verbalized understanding. Patient left ED via Discharge Method: ambulatory to Home with ( self). Opportunity for questions and clarification provided. Patient given 2 scripts. To continue your aftercare when you leave the hospital, you may receive an automated call from our care team to check in on how you are doing. This is a free service and part of our promise to provide the best care and service to meet your aftercare needs.  If you have questions, or wish to unsubscribe from this service please call [phone]. Thank you for Choosing our [facility].

## 2019-09-08 NOTE — DISCHARGE INSTRUCTIONS
Use inhaler with spacer every 6 hours for the next 2 to 3 days. Cough medication as needed. Consider wearing mask around her  for 48 hours. Tylenol for any fever. Recheck 2 to 3 days if not improving. Recheck sooner for worse shortness of breath or wheezing. Patient Education        Reactive Airway Disease: Care Instructions  Your Care Instructions    Reactive airway disease is a breathing problem that appears as wheezing, a whistling noise in your airways. It may be caused by a viral or bacterial infection, allergies, tobacco smoke, or something else in the environment. When you are around these triggers, your body releases chemicals that make the airways get tight. Reactive airway disease is a lot like asthma. Both can cause wheezing. But asthma is ongoing, while reactive airway disease may occur only now and then. Tests can be done to tell whether you have asthma. You may take the same medicines used to treat asthma. Good home care and follow-up care with your doctor can help you recover. Follow-up care is a key part of your treatment and safety. Be sure to make and go to all appointments, and call your doctor if you are having problems. It's also a good idea to know your test results and keep a list of the medicines you take. How can you care for yourself at home? · Take your medicines exactly as prescribed. Call your doctor if you think you are having a problem with your medicine. · Do not smoke or allow others to smoke around you. If you need help quitting, talk to your doctor about stop-smoking programs and medicines. These can increase your chances of quitting for good. · If you know what caused your wheezing (such as perfume or the odor of household chemicals), try to avoid it in the future. · Wash your hands several times a day, and consider using hand gels or wipes that contain alcohol. This can prevent colds and other infections. When should you call for help?   Call 911 anytime you think you may need emergency care. For example, call if:    · You have severe trouble breathing.    Watch closely for changes in your health, and be sure to contact your doctor if:    · You cough up yellow, dark brown, or bloody mucus.     · You have a fever.     · Your wheezing gets worse. Where can you learn more? Go to http://parris-oneyda.info/. Enter A715 in the search box to learn more about \"Reactive Airway Disease: Care Instructions. \"  Current as of: September 5, 2018  Content Version: 12.1  © 6493-8225 [employer]. Care instructions adapted under license by [employer] (which disclaims liability or warranty for this information). If you have questions about a medical condition or this instruction, always ask your healthcare professional. [place] [address] any warranty or liability for your use of this information. Patient Education        Viral Respiratory Infection: Care Instructions  Your Care Instructions    Viruses are very small organisms. They grow in number after they enter your body. There are many types that cause different illnesses, such as colds and the mumps. The symptoms of a viral respiratory infection often start quickly. They include a fever, sore throat, and runny nose. You may also just not feel well. Or you may not want to eat much. Most viral respiratory infections are not serious. They usually get better with time and self-care. Antibiotics are not used to treat a viral infection. That's because antibiotics will not help cure a viral illness. In some cases, antiviral medicine can help your body fight a serious viral infection. Follow-up care is a key part of your treatment and safety. Be sure to make and go to all appointments, and call your doctor if you are having problems. It's also a good idea to know your test results and keep a list of the medicines you take.   How can you care for yourself at home?  · Rest as much as possible until you feel better. · Be safe with medicines. Take your medicine exactly as prescribed. Call your doctor if you think you are having a problem with your medicine. You will get more details on the specific medicine your doctor prescribes. · Take an over-the-counter pain medicine, such as acetaminophen (Tylenol), ibuprofen (Advil, Motrin), or naproxen (Aleve), as needed for pain and fever. Read and follow all instructions on the label. Do not give aspirin to anyone younger than 20. It has been linked to Reye syndrome, a serious illness. · Drink plenty of fluids, enough so that your urine is light yellow or clear like water. Hot fluids, such as tea or soup, may help relieve congestion in your nose and throat. If you have kidney, heart, or liver disease and have to limit fluids, talk with your doctor before you increase the amount of fluids you drink. · Try to clear mucus from your lungs by breathing deeply and coughing. · Gargle with warm salt water once an hour. This can help reduce swelling and throat pain. Use 1 teaspoon of salt mixed in 1 cup of warm water. · Do not smoke or allow others to smoke around you. If you need help quitting, talk to your doctor about stop-smoking programs and medicines. These can increase your chances of quitting for good. To avoid spreading the virus  · Cough or sneeze into a tissue. Then throw the tissue away. · If you don't have a tissue, use your hand to cover your cough or sneeze. Then clean your hand. You can also cough into your sleeve. · Wash your hands often. Use soap and warm water. Wash for 15 to 20 seconds each time. · If you don't have soap and water near you, you can clean your hands with alcohol wipes or gel. When should you call for help?   Call your doctor now or seek immediate medical care if:    · You have a new or higher fever.     · Your fever lasts more than 48 hours.     · You have trouble breathing.     · You have a fever with a stiff neck or a severe headache.     · You are sensitive to light.     · You feel very sleepy or confused.    Watch closely for changes in your health, and be sure to contact your doctor if:    · You do not get better as expected. Where can you learn more? Go to http://parris-oneyda.info/. Enter O376 in the search box to learn more about \"Viral Respiratory Infection: Care Instructions. \"  Current as of: September 5, 2018  Content Version: 12.1  © 8446-1220 [employer]. Care instructions adapted under license by [employer] (which disclaims liability or warranty for this information). If you have questions about a medical condition or this instruction, always ask your healthcare professional. [address] any warranty or liability for your use of this information.

## 2019-09-08 NOTE — ED TRIAGE NOTES
Pt complains of SOB X2 days with chest congestion and cough. Pt reports history of bronchitis and states she has been around a family member with a URI.

## 2019-09-08 NOTE — ED PROVIDER NOTES
42-year-old female 1 month postpartum. Had . Not breast-feeding. Complaining of URI symptoms 4 days with chills nasal congestion and drainage. Cough with clear than greenish sputum. Sore back with coughing as well. Myalgias in legs and arms. No dysuria. No vomiting. Feels short of breath has had some wheezing. History of bronchitis. She is smoking again. Use inhaler in the past.  No history of DVT or PE    The history is provided by the patient. Cough   This is a new problem. The current episode started more than 2 days ago. The problem occurs every few minutes. The problem has not changed since onset. The cough is productive of sputum. There has been no fever. Associated symptoms include chills, rhinorrhea, myalgias, shortness of breath and wheezing. Pertinent negatives include no chest pain, no nausea and no vomiting. She has tried nothing for the symptoms. She is a smoker. Her past medical history is significant for bronchitis. Her past medical history does not include pneumonia or asthma. Past Medical History:   Diagnosis Date    Anemia     Bronchitis     Chlamydia     5 yrs ago?     Gestational diabetes        Past Surgical History:   Procedure Laterality Date    HX  SECTION           Family History:   Problem Relation Age of Onset    Cancer Maternal Aunt         Lung and other    Alzheimer Maternal Grandmother        Social History     Socioeconomic History    Marital status: SINGLE     Spouse name: Not on file    Number of children: Not on file    Years of education: Not on file    Highest education level: Not on file   Occupational History    Not on file   Social Needs    Financial resource strain: Not on file    Food insecurity:     Worry: Not on file     Inability: Not on file    Transportation needs:     Medical: Not on file     Non-medical: Not on file   Tobacco Use    Smoking status: Former Smoker    Smokeless tobacco: Never Used   Substance and Sexual Activity    Alcohol use: No     Frequency: Never    Drug use: No    Sexual activity: Yes     Partners: Male     Birth control/protection: None   Lifestyle    Physical activity:     Days per week: Not on file     Minutes per session: Not on file    Stress: Not on file   Relationships    Social connections:     Talks on phone: Not on file     Gets together: Not on file     Attends [employer] service: Not on file     Active member of club or organization: Not on file     Attends meetings of clubs or organizations: Not on file     Relationship status: Not on file    Intimate partner violence:     Fear of current or ex partner: Not on file     Emotionally abused: Not on file     Physically abused: Not on file     Forced sexual activity: Not on file   Other Topics Concern     Service Not Asked    Blood Transfusions Not Asked    Caffeine Concern Not Asked    Occupational Exposure Not Asked   [place] Hazards Not Asked    Sleep Concern Not Asked    Stress Concern Not Asked    Weight Concern Not Asked    Special Diet Not Asked    Back Care Not Asked    Exercise Not Asked    Bike Helmet Not Asked   [address] Not Asked    Self-Exams Not Asked   Social History Narrative    Not on file         ALLERGIES: Patient has no known allergies. Review of Systems   Constitutional: Positive for chills. Negative for fever. HENT: Positive for rhinorrhea. Respiratory: Positive for cough, shortness of breath and wheezing. Cardiovascular: Negative for chest pain. Gastrointestinal: Negative for nausea and vomiting. Musculoskeletal: Positive for myalgias. Vitals:    09/07/19 2337 09/07/19 2358   BP: 146/78    Pulse: 86    Resp: 19    Temp: 99.1 °F (37.3 °C)    SpO2: 97% 97%   Weight: 90.7 kg (200 lb)    Height: 5' 7\" (1.702 m)             Physical Exam   Constitutional: She is oriented to person, place, and time. She appears well-developed and well-nourished. No distress.    HENT:   Head: Normocephalic and atraumatic. Right Ear: External ear normal.   Left Ear: External ear normal.   Mouth/Throat: Oropharynx is clear and moist. No oropharyngeal exudate. Eyes: Pupils are equal, round, and reactive to light. Conjunctivae and EOM are normal.   Neck: Normal range of motion. Neck supple. Cardiovascular: Normal rate, regular rhythm and intact distal pulses. No murmur heard. Pulmonary/Chest: No respiratory distress. She has wheezes. Neurological: She is alert and oriented to person, place, and time. Gait normal.   Nl speech   Skin: Skin is warm and dry. Psychiatric: She has a normal mood and affect. Her speech is normal.   Nursing note and vitals reviewed. MDM  Number of Diagnoses or Management Options  Diagnosis management comments: Check chest x-ray for pneumonia       Amount and/or Complexity of Data Reviewed  Tests in the radiology section of CPT®: ordered and reviewed  Independent visualization of images, tracings, or specimens: yes    Risk of Complications, Morbidity, and/or Mortality  Presenting problems: moderate  Diagnostic procedures: minimal  Management options: low    Patient Progress  Patient progress: stable         Procedures    Chest x-ray negative per my interpretation.       Doubt pulmonary embolism
